# Patient Record
Sex: MALE | Race: OTHER | NOT HISPANIC OR LATINO
[De-identification: names, ages, dates, MRNs, and addresses within clinical notes are randomized per-mention and may not be internally consistent; named-entity substitution may affect disease eponyms.]

---

## 2017-11-01 ENCOUNTER — TRANSCRIPTION ENCOUNTER (OUTPATIENT)
Age: 62
End: 2017-11-01

## 2018-01-30 ENCOUNTER — APPOINTMENT (OUTPATIENT)
Dept: PULMONOLOGY | Facility: CLINIC | Age: 63
End: 2018-01-30
Payer: COMMERCIAL

## 2018-01-30 VITALS
HEART RATE: 83 BPM | HEIGHT: 65 IN | SYSTOLIC BLOOD PRESSURE: 110 MMHG | BODY MASS INDEX: 33.32 KG/M2 | WEIGHT: 200 LBS | RESPIRATION RATE: 16 BRPM | DIASTOLIC BLOOD PRESSURE: 80 MMHG | OXYGEN SATURATION: 97 % | TEMPERATURE: 97.2 F

## 2018-01-30 PROCEDURE — 99213 OFFICE O/P EST LOW 20 MIN: CPT

## 2021-12-07 ENCOUNTER — TRANSCRIPTION ENCOUNTER (OUTPATIENT)
Age: 66
End: 2021-12-07

## 2022-01-31 ENCOUNTER — APPOINTMENT (OUTPATIENT)
Dept: ORTHOPEDIC SURGERY | Facility: CLINIC | Age: 67
End: 2022-01-31
Payer: MEDICARE

## 2022-01-31 ENCOUNTER — NON-APPOINTMENT (OUTPATIENT)
Age: 67
End: 2022-01-31

## 2022-01-31 VITALS
BODY MASS INDEX: 34.99 KG/M2 | SYSTOLIC BLOOD PRESSURE: 132 MMHG | HEIGHT: 65 IN | WEIGHT: 210 LBS | HEART RATE: 76 BPM | DIASTOLIC BLOOD PRESSURE: 77 MMHG

## 2022-01-31 DIAGNOSIS — M65.312 TRIGGER THUMB, LEFT THUMB: ICD-10-CM

## 2022-01-31 PROCEDURE — 99203 OFFICE O/P NEW LOW 30 MIN: CPT | Mod: 25

## 2022-01-31 PROCEDURE — 20550 NJX 1 TENDON SHEATH/LIGAMENT: CPT | Mod: FA

## 2022-01-31 NOTE — HISTORY OF PRESENT ILLNESS
[Left] : left hand dominant [FreeTextEntry1] : Pt is a 65 y/o male with left thumb pain and triggering x 2 months.  It is worse in the morning when he has to force the finger open.  He has tenderness over the A1 pulley.  He has stiffness.  He states that the symptoms improve throughout the day.

## 2022-01-31 NOTE — PHYSICAL EXAM
[de-identified] : Patient is WDWN, alert, and in no acute distress. Breathing is unlabored. He is grossly oriented to person, place, and time.\par \par Left Hand (Thumb):\par There is A1 pulley tenderness in the [] finger. Full arc of motion in the fingers, and all intrinsic and extrinsic hand muscles 5/5. No joint instability on provocative testing, sensation is intact to light touch, and no skin lesions or discoloration.  [de-identified] : no imaging today

## 2022-01-31 NOTE — DISCUSSION/SUMMARY
[FreeTextEntry1] : The underlying pathophysiology was reviewed with the patient. Discussed at length the nature of the patient’s condition. The left thumb symptoms appear secondary to trigger finger.\par \par At this time, I am recommending a cortisone injection to the flexor tendon sheath of the left thumb.\par The patient wishes to proceed with a cortisone injection at this time. The skin was prepped with alcohol and sprayed with Ethyl Chloride. An injection of 0.5 cc 1% Lidocaine without epinephrine, 0.25 cc Kenalog 40mg, and 0.25 cc Dexamethasone was administered into the flexor tendon sheath of the LEFT thumb (1). The patient tolerated the procedure well. Apply ice. \par \par All questions answered, understanding verbalized. Patient in agreement with plan of care. Follow up as needed, according to his symptoms.

## 2022-01-31 NOTE — ADDENDUM
[FreeTextEntry1] : I, Fernanda Peters wrote this note acting as a scribe for Dr. Jese Navarro on Jan 31, 2022.

## 2022-01-31 NOTE — END OF VISIT
[FreeTextEntry3] : All medical record entries made by the Scribe were at my,  Dr. Jese Navarro MD., direction and personally dictated by me on 01/31/2022. I have personally reviewed the chart and agree that the record accurately reflects my personal performance of the history, physical exam, assessment and plan.

## 2023-04-28 ENCOUNTER — APPOINTMENT (OUTPATIENT)
Dept: ORTHOPEDIC SURGERY | Facility: CLINIC | Age: 68
End: 2023-04-28

## 2023-05-09 ENCOUNTER — APPOINTMENT (OUTPATIENT)
Dept: NEUROSURGERY | Facility: CLINIC | Age: 68
End: 2023-05-09
Payer: MEDICARE

## 2023-05-09 VITALS
BODY MASS INDEX: 34.99 KG/M2 | HEART RATE: 78 BPM | WEIGHT: 210 LBS | HEIGHT: 65 IN | DIASTOLIC BLOOD PRESSURE: 85 MMHG | SYSTOLIC BLOOD PRESSURE: 131 MMHG

## 2023-05-09 PROCEDURE — 99203 OFFICE O/P NEW LOW 30 MIN: CPT

## 2023-05-09 PROCEDURE — 72100 X-RAY EXAM L-S SPINE 2/3 VWS: CPT

## 2023-05-22 NOTE — REASON FOR VISIT
[New Patient Visit] : a new patient visit [Referred By: _________] : Patient was referred by DAVID [FreeTextEntry1] : Lumbar pain- no imaging

## 2023-05-22 NOTE — RESULTS/DATA
[FreeTextEntry1] : X-rays of the lumbar spine and AP lateral views completed on 5/9/2023 in the office interpretation limited only to spine shows good alignment and normal curvature of the spine.  There is signs of degenerative disc disease at L5-S1\par

## 2023-05-22 NOTE — HISTORY OF PRESENT ILLNESS
[FreeTextEntry1] : low back pain [de-identified] : 67 year old male 2 week low back pain\par denies leg pain\par no new nt, weakness, balance issues, or bladder issues\par denies relieving or aggravating factors\par no pt\par alleve and muscle relaxer with some relief

## 2023-05-22 NOTE — PLAN
[FreeTextEntry1] : In summary this is a 67-year-old male with new onset back pain.  Recommend conservative measures we will start physical therapy as well as a course of Celebrex if the symptoms were to persist over the next 6 weeks I would consider ordering MRI lumbar spine understands and agrees with plan I will send the medication to his pharmacy.  Provided prescription for physical therapy

## 2023-05-31 ENCOUNTER — APPOINTMENT (OUTPATIENT)
Dept: NEUROSURGERY | Facility: CLINIC | Age: 68
End: 2023-05-31
Payer: MEDICARE

## 2023-05-31 VITALS — DIASTOLIC BLOOD PRESSURE: 82 MMHG | SYSTOLIC BLOOD PRESSURE: 129 MMHG

## 2023-05-31 DIAGNOSIS — M54.50 LOW BACK PAIN, UNSPECIFIED: ICD-10-CM

## 2023-05-31 PROCEDURE — 99213 OFFICE O/P EST LOW 20 MIN: CPT

## 2023-05-31 NOTE — PLAN
[FreeTextEntry1] : In summary this is a 67-year-old male with new onset back pain.  no surgery intervention\par conservative measures only

## 2023-05-31 NOTE — RESULTS/DATA
[FreeTextEntry1] : X-rays of the lumbar spine and AP lateral views completed on 5/9/2023 in the office interpretation limited only to spine shows good alignment and normal curvature of the spine.  There is signs of degenerative disc disease at L5-S1\par \par mri zpr show mild ddd and small hnp L3-4

## 2023-05-31 NOTE — HISTORY OF PRESENT ILLNESS
[FreeTextEntry1] : low back pain [de-identified] : 67 year old male 2 week low back pain\par denies leg pain\par no new nt, weakness, balance issues, or bladder issues\par denies relieving or aggravating factors\par no pt\par alleve and muscle relaxer with some relief\par \par \par fu 5/31 back pain improved\par right medial knee numbness\par has mri

## 2023-05-31 NOTE — REASON FOR VISIT
[Follow-Up: _____] : a [unfilled] follow-up visit [FreeTextEntry1] : pt 68 y/o M presents in office today for MRI review no other complaints

## 2024-01-22 ENCOUNTER — APPOINTMENT (OUTPATIENT)
Dept: CARDIOLOGY | Facility: CLINIC | Age: 69
End: 2024-01-22
Payer: MEDICARE

## 2024-01-22 VITALS — DIASTOLIC BLOOD PRESSURE: 68 MMHG | SYSTOLIC BLOOD PRESSURE: 108 MMHG

## 2024-01-22 VITALS
SYSTOLIC BLOOD PRESSURE: 110 MMHG | HEART RATE: 80 BPM | BODY MASS INDEX: 34.49 KG/M2 | WEIGHT: 207 LBS | DIASTOLIC BLOOD PRESSURE: 70 MMHG | HEIGHT: 65 IN | OXYGEN SATURATION: 99 %

## 2024-01-22 DIAGNOSIS — I25.10 ATHEROSCLEROTIC HEART DISEASE OF NATIVE CORONARY ARTERY W/OUT ANGINA PECTORIS: ICD-10-CM

## 2024-01-22 DIAGNOSIS — Z78.9 OTHER SPECIFIED HEALTH STATUS: ICD-10-CM

## 2024-01-22 DIAGNOSIS — I10 ESSENTIAL (PRIMARY) HYPERTENSION: ICD-10-CM

## 2024-01-22 DIAGNOSIS — I77.810 THORACIC AORTIC ECTASIA: ICD-10-CM

## 2024-01-22 PROCEDURE — 99204 OFFICE O/P NEW MOD 45 MIN: CPT

## 2024-01-22 RX ORDER — CELECOXIB 200 MG/1
200 CAPSULE ORAL DAILY
Qty: 30 | Refills: 2 | Status: DISCONTINUED | COMMUNITY
Start: 2023-05-09 | End: 2024-01-22

## 2024-01-22 RX ORDER — CARISOPRODOL 350 MG/1
350 TABLET ORAL
Refills: 0 | Status: DISCONTINUED | COMMUNITY
End: 2024-01-22

## 2024-01-22 NOTE — ASSESSMENT
[FreeTextEntry1] : CAD: The patient has CAD by CT calcium scoring.  Continue aspirin and Plavix therapy.  Dilated aorta: We will plan for repeat echocardiography in April.  Will consider CT scanning of the aorta at that time.  Studies reviewed from outside are captured in the HPI section of this note.

## 2024-01-22 NOTE — REASON FOR VISIT
[FreeTextEntry1] :   Chief complaint: The patient seeks a second opinion with regards to dilated aorta and coronary artery calcification.  The patient underwent CT calcium scoring.  It was low but not 0.  The patient underwent myocardial perfusion scanning which revealed normal no myocardial perfusion defects.  In addition the patient underwent transthoracic echocardiography which showed an ascending aorta of 4.0 cm.  Patient also has slightly dilated right ventricle and right atrium.  There is mild tricuspid insufficiency.  The patient brings with him phlebotomy with an LDL of 70 AST and ALT LAT normal these were done on December 7, 2023.  Transthoracic echocardiography reported above was done on March 24, 2023.  PET myocardial perfusion scanning revealing no evidence of myocardial perfusion defects was done on April 24, 2023.

## 2024-02-09 ENCOUNTER — APPOINTMENT (OUTPATIENT)
Dept: PULMONOLOGY | Facility: CLINIC | Age: 69
End: 2024-02-09
Payer: MEDICARE

## 2024-02-09 VITALS
HEIGHT: 65 IN | DIASTOLIC BLOOD PRESSURE: 85 MMHG | HEART RATE: 72 BPM | WEIGHT: 204 LBS | BODY MASS INDEX: 33.99 KG/M2 | SYSTOLIC BLOOD PRESSURE: 144 MMHG | RESPIRATION RATE: 17 BRPM | OXYGEN SATURATION: 98 %

## 2024-02-09 PROCEDURE — 99204 OFFICE O/P NEW MOD 45 MIN: CPT

## 2024-02-09 NOTE — PHYSICAL EXAM
[General Appearance - Well Developed] : well developed [Normal Conjunctiva] : the conjunctiva exhibited no abnormalities [Neck Appearance] : the appearance of the neck was normal [Oriented To Time, Place, And Person] : oriented to person, place, and time [Affect] : the affect was normal [III] : III [Heart Rate And Rhythm] : heart rate and rhythm were normal [Heart Sounds] : normal S1 and S2 [Murmurs] : no murmurs present [Respiration, Rhythm And Depth] : normal respiratory rhythm and effort [Auscultation Breath Sounds / Voice Sounds] : lungs were clear to auscultation bilaterally [Skin Color & Pigmentation] : normal skin color and pigmentation [Abnormal Walk] : normal gait [Nail Clubbing] : no clubbing  or cyanosis of the fingernails [] : no rash [No Focal Deficits] : no focal deficits [FreeTextEntry1] : No edema

## 2024-02-09 NOTE — ASSESSMENT
[FreeTextEntry1] : ATTENDING ATTESTATION  68 year old Pmhx of severe ENIO AHI 54 on CPAP of 10, HTN, dilated aortic root. Here today for new machine has not been seen since 2018 machine is at end of its motor lifespan per error message.   Severe ENIO.  -Data download from last 30 days reviewed for compliance and therapy shows 100% compliance, average use 7 hours 25 min, treatment AHI 2.4hr  - Advised against using ozone related products for cleaning the pap machine and supplies, discussed potential risks of foam breakdown ect. and Recommended pt use warm soapy water and let air dry  - Discussed importance of using new masks and tubing and will receive from DME  - Prior DME was [facility]  - ordered new CPAP of 10 today - Nasal pillows mask  - f/u 1-2 months after obtains device can be teb visit.   HTN  - repeat /85  - Recommended pt follow up with cardiology may need dose adjustment for amlodipine.

## 2024-02-09 NOTE — END OF VISIT
[] : Fellow [FreeTextEntry3] : I, Dr. Alice Fishman personally performed the evaluation and management (E/M) services for this new patient.  That E/M includes conducting the initial examination, assessing all conditions, and establishing the plan of care.  Today, Vielka Palomares ACP was here to observe my evaluation and management services for this patient to be followed going forward.  Pt with severe ENIO AHI 54 on CPAP of 10, HTN, dilated aortic root. Here today for new machine has not been seen since 2018 machine is at end of its motor lifespan per error message. Order new CPAP 10, normalized AHI on therapy data review and good adherence. Patient was advised to followup in office visit for compliance and therapy data within one to 2 months of initiating pap therapy.  45 minutes time spent for patient education related to comorbidities and medications, medical records/labs/radiology reviews,  preventative care, documentation, coordination of care. 11:15 AM to 12 pm

## 2024-02-09 NOTE — HISTORY OF PRESENT ILLNESS
[Obstructive Sleep Apnea] : obstructive sleep apnea [Snoring] : snoring [Witnessed Apneas] : witnessed sleep apnea [Unintentional Sleep While Inactive] : unintentional sleep while inactive [Daytime Somnolence] : daytime somnolence [To Bed: ___] : ~he/she~ goes to bed at [unfilled] [Arises: ___] : arises at [unfilled] [Sleep Onset Latency: ___ minutes] : sleep onset latency of [unfilled] minutes reported [Nocturnal Awakenings: ___] : ~he/she~ typically has [unfilled] nocturnal awakenings [WASO: ___] : Wake time after sleep onset is [unfilled] [TST: ___] : Total sleep time is [unfilled] [Daytime Sleep: ___] : daytime sleep: [unfilled] [FreeTextEntry1] : 67 yo M with a h/o ENIO diagnosed in 2009 at [facility], AHI of 22.3 and was treated with APAP set up to a max pressure of 20. He was seen for re-evaluation of his ENIO in June 2016, underwent Split study on 6/22/16 which showed evidence of severe ENIO with AHI of 54/hr and was titrated to a CPAP pressure of 10 cmH2O. Has been using new Resmed Aisense 10 AutoSet set at [id number] with a nasal pillows.  He hasn't been seen since 2018 in office. Pt reports good benefit with CPAP usage, resolution of daytime somnolence. He denies HA, dry mouth, EDS, drowsy driving. No new diagnoses, surgeries or hospitalizations. He notices that when he doesn't use CPAP (rarely), he has witnessed apneas and gasping sensations and snoring. He was living in [place] but has since returned to [place] in 2022. Has not taken his BP meds in a few days because he was running low 114/60s, . He uses his machine nightly, but it has an error message that says it is near the end of its motor lifespan. Was using ozone   Data download from last 30 days for compliance and therapy shows 100% compliance, average use 7 hours 25 min, treatment AHI 2.4hr   [Frequent Nocturnal Awakening] : no nocturnal awakening [Unintentional Sleep while Active] : no unintentional sleep while active [Awakes Unrefreshed] : does not awake unrefreshed [Awakes with Headache] : no headache upon awakening [Awakening With Dry Mouth] : no dry mouth upon awakening [Recent  Weight Gain] : no recent weight gain [DIS] : no DIS [DMS] : no DMS [Unusual Sleep Behavior] : no unusual sleep behavior [Hypersomnolence] : no hypersomnolence [Cataplexy] : no cataplexy [Sleep Paralysis] : no sleep paralysis [Hypnagogic Hallucinations] : no hallucinations when falling asleep [Hypnopompic Hallucinations] : no hallucinations when awakening [Lower Extremity Discomfort] : no lower extremity discomfort in evening or at bedtime [ESS] : 8

## 2024-02-09 NOTE — DISCUSSION/SUMMARY
[FreeTextEntry1] : 63 yo M with a h/o ENIO diagnosed in 2009 at [facility], AHI of 22.3 and was treated with APAP set up to a max pressure of 20, written on DME prescription order from 2009 provided by pt. \par  He was seen for re-evaluation of his ENIO in June 2016, underwent Split study on 6/22/16 which showed evidence of severe ENIO with AHI of 54/hr and was titrated to a CPAP pressure of 10 cmH2O. Has been using new Resmed Aisense 10 AutoSet set at [id number] with a nasal mask.\par  \par  He reports good benefit with CPAP usage, resolution of daytime somnolence. He denies HA, dry mouth, EDS, drowsy driving. Bedtime 10 pm, out of bed at 6 am. \par  \par  A/P: Severe ENIO on CPAP treatment. Clinically deriving benefit and AHI on therapy data is within normal limits.\par  Will continue with CPAP therapy. Advised that should be receiving a new mask every 3 months and other supplies every 6-12 months. Annual follow up or earlier as needed.

## 2024-02-21 ENCOUNTER — TRANSCRIPTION ENCOUNTER (OUTPATIENT)
Age: 69
End: 2024-02-21

## 2024-04-22 ENCOUNTER — APPOINTMENT (OUTPATIENT)
Dept: CARDIOLOGY | Facility: CLINIC | Age: 69
End: 2024-04-22
Payer: MEDICARE

## 2024-04-22 PROCEDURE — 93306 TTE W/DOPPLER COMPLETE: CPT

## 2024-04-23 ENCOUNTER — APPOINTMENT (OUTPATIENT)
Dept: CARDIOLOGY | Facility: CLINIC | Age: 69
End: 2024-04-23
Payer: MEDICARE

## 2024-04-23 VITALS
HEART RATE: 77 BPM | BODY MASS INDEX: 33.99 KG/M2 | HEIGHT: 65 IN | SYSTOLIC BLOOD PRESSURE: 142 MMHG | OXYGEN SATURATION: 97 % | DIASTOLIC BLOOD PRESSURE: 80 MMHG | WEIGHT: 204 LBS

## 2024-04-23 PROCEDURE — 99214 OFFICE O/P EST MOD 30 MIN: CPT

## 2024-04-23 NOTE — ASSESSMENT
[FreeTextEntry1] : Palpitations: 7-day event monitoring has been arranged to evaluate for dysrhythmia.  Hyperlipidemia: Atorvastatin has been prescribed  at the patient's request.  Hyperlipidemia: Atorvastatin has been prescribed the patient's request.  Liver cyst: GI evaluation has been recommended.  Pulmonary hypertension: Pulmonary evaluation has been recommended.

## 2024-04-23 NOTE — REASON FOR VISIT
[FreeTextEntry1] : Patient seen in follow-up for hypertension hyperlipidemia.  The patient shows fairly good exercise capacity without exertional symptoms.  The patient is a palpitation syndrome.  He feels a fluttering like sensation in his chest for a few seconds at a time.  It seems to occur a few times per day.  There has been no syncope.  There has been no near syncope.

## 2024-04-26 ENCOUNTER — APPOINTMENT (OUTPATIENT)
Dept: ENDOCRINOLOGY | Facility: CLINIC | Age: 69
End: 2024-04-26
Payer: MEDICARE

## 2024-04-26 VITALS
SYSTOLIC BLOOD PRESSURE: 130 MMHG | HEIGHT: 65 IN | BODY MASS INDEX: 34.16 KG/M2 | WEIGHT: 205 LBS | OXYGEN SATURATION: 97 % | DIASTOLIC BLOOD PRESSURE: 84 MMHG | HEART RATE: 64 BPM

## 2024-04-26 DIAGNOSIS — E66.9 OBESITY, UNSPECIFIED: ICD-10-CM

## 2024-04-26 DIAGNOSIS — R73.03 PREDIABETES.: ICD-10-CM

## 2024-04-26 DIAGNOSIS — R53.83 OTHER FATIGUE: ICD-10-CM

## 2024-04-26 DIAGNOSIS — E78.5 HYPERLIPIDEMIA, UNSPECIFIED: ICD-10-CM

## 2024-04-26 PROCEDURE — 99204 OFFICE O/P NEW MOD 45 MIN: CPT

## 2024-04-26 RX ORDER — TIRZEPATIDE 2.5 MG/.5ML
2.5 INJECTION, SOLUTION SUBCUTANEOUS
Qty: 4 | Refills: 0 | Status: ACTIVE | COMMUNITY
Start: 2024-04-26 | End: 1900-01-01

## 2024-04-26 NOTE — REASON FOR VISIT
[Initial Evaluation] : an initial evaluation [Hypogonadism] : hypogonadism [Weight Management/Obesity] : weight management/obesity

## 2024-04-26 NOTE — ASSESSMENT
[FreeTextEntry1] : 1- Fatigue despite using CPCP  Plan: -Pt was asked to check labs fasting early morning after a full night sleep. We discussed we need to 2 set of abnormal TFT to ensure testosterone is low. I explained with weight loss his body will naturally produce more T.  - Will call you with instructions to repeat the labs if needed after first set of labs are completed.   2- Vit D deficiency  Started taking Vit D 5,000 IU daily since Feb after labs showed a vit D level of 46. He was advised to decrease the dose to 1000 IU daily.   3- Obesity 4- DM 5- HLD- currently on Atorvastatin 10 mg daily  Plan: - Will start Zepbound 2.5 mg once a week. We discussed to discontinue the medication if he develops abdominal pain and seek immediate medical attention.  - Currently on [employer] system  - Will check lipid panel   Follow up in 3 month.

## 2024-04-26 NOTE — HISTORY OF PRESENT ILLNESS
[FreeTextEntry1] : 68 year old man with h/o obesity (BMI 34), ENIO, HLD and HTN presented to establish care.   Labs from Feb 2024-  Vit D 46, TSH 0.5, FT4 1.3, Total T 554, Free T at 5.3 (RR 6.6-18.1)  Normal bone scan in 2024.   He is concerned about having low T, and belives that may be the reason he has difficulty with weight loss.   Never been on TRT.   No h/o VTE, prostate cancer. No h/o known CAD.   Reports difficulty with weight management since childhood. He last some weight with exercise but regained it back when he started working. Several times tried [employer] system, lost at most about 20 lb and regained it back. Reports being physically active and tryung to eat healthy diet.   No h/o pancreatitis.  Takes Atorvastatin 10 mg daily for hyperlipidemia.  Started taking Vit D 5,000 IU since few month ago.

## 2024-04-29 DIAGNOSIS — Z80.8 FAMILY HISTORY OF MALIGNANT NEOPLASM OF OTHER ORGANS OR SYSTEMS: ICD-10-CM

## 2024-04-29 DIAGNOSIS — G47.30 SLEEP APNEA, UNSPECIFIED: ICD-10-CM

## 2024-04-29 DIAGNOSIS — Z80.3 FAMILY HISTORY OF MALIGNANT NEOPLASM OF BREAST: ICD-10-CM

## 2024-04-29 DIAGNOSIS — Z82.49 FAMILY HISTORY OF ISCHEMIC HEART DISEASE AND OTHER DISEASES OF THE CIRCULATORY SYSTEM: ICD-10-CM

## 2024-04-29 DIAGNOSIS — Z77.22 CONTACT WITH AND (SUSPECTED) EXPOSURE TO ENVIRONMENTAL TOBACCO SMOKE (ACUTE) (CHRONIC): ICD-10-CM

## 2024-04-29 DIAGNOSIS — Z86.79 PERSONAL HISTORY OF OTHER DISEASES OF THE CIRCULATORY SYSTEM: ICD-10-CM

## 2024-04-29 DIAGNOSIS — Z78.9 OTHER SPECIFIED HEALTH STATUS: ICD-10-CM

## 2024-04-29 RX ORDER — CHROMIUM 200 MCG
TABLET ORAL
Refills: 0 | Status: ACTIVE | COMMUNITY

## 2024-04-29 RX ORDER — AMLODIPINE BESYLATE 10 MG/1
10 TABLET ORAL
Qty: 90 | Refills: 3 | Status: ACTIVE | COMMUNITY

## 2024-04-29 RX ORDER — MULTIVITAMIN/IRON/FOLIC ACID 18MG-0.4MG
TABLET ORAL
Refills: 0 | Status: ACTIVE | COMMUNITY

## 2024-04-29 RX ORDER — FAMOTIDINE 20 MG/1
20 TABLET, FILM COATED ORAL
Refills: 0 | Status: ACTIVE | COMMUNITY

## 2024-04-29 RX ORDER — ZINC SULFATE 50(220)MG
CAPSULE ORAL
Refills: 0 | Status: ACTIVE | COMMUNITY

## 2024-04-29 RX ORDER — TURMERIC/TURMERIC EXT/PEPR EXT 900-100 MG
CAPSULE ORAL
Refills: 0 | Status: ACTIVE | COMMUNITY

## 2024-04-29 RX ORDER — VITAMIN E (DL,TOCOPHERYL ACET) 180 MG
CAPSULE ORAL
Refills: 0 | Status: ACTIVE | COMMUNITY

## 2024-04-29 RX ORDER — CHOLECALCIFEROL (VITAMIN D3) 125 MCG
125 MCG CAPSULE ORAL
Refills: 0 | Status: ACTIVE | COMMUNITY

## 2024-04-29 RX ORDER — ATORVASTATIN CALCIUM 10 MG/1
10 TABLET, FILM COATED ORAL
Qty: 90 | Refills: 3 | Status: ACTIVE | COMMUNITY

## 2024-04-29 RX ORDER — MAGNESIUM OXIDE/MAG AA CHELATE 300 MG
CAPSULE ORAL
Refills: 0 | Status: ACTIVE | COMMUNITY

## 2024-04-29 RX ORDER — LISINOPRIL 40 MG/1
40 TABLET ORAL
Refills: 0 | Status: ACTIVE | COMMUNITY

## 2024-04-29 RX ORDER — BERBERINE CHLOR/SEAWEED/CHROM 500-250 MG
CAPSULE ORAL
Refills: 0 | Status: ACTIVE | COMMUNITY

## 2024-04-29 RX ORDER — ASPIRIN 81 MG
81 TABLET, DELAYED RELEASE (ENTERIC COATED) ORAL
Refills: 0 | Status: ACTIVE | COMMUNITY

## 2024-04-29 RX ORDER — PSYLLIUM HUSK 0.4 G
CAPSULE ORAL
Refills: 0 | Status: ACTIVE | COMMUNITY

## 2024-05-07 ENCOUNTER — APPOINTMENT (OUTPATIENT)
Dept: CARDIOLOGY | Facility: CLINIC | Age: 69
End: 2024-05-07
Payer: MEDICARE

## 2024-05-07 DIAGNOSIS — R00.2 PALPITATIONS: ICD-10-CM

## 2024-05-07 PROCEDURE — 99214 OFFICE O/P EST MOD 30 MIN: CPT

## 2024-05-07 NOTE — ASSESSMENT
[FreeTextEntry1] : Palpitations: Patient had multiple palpitations in the setting of normal sinus rhythm.  He also has occasional APCs and VPCs.  There was at least 1 ventricular triplet.    Patient does not wish to undergo beta-blockade therapy.  Plan will be to consider beta-blockade if his symptoms worsen.  Chest discomfort: None recently.  Holter monitoring in April 2024 revealed symptoms with normal sinus rhythm, APCs and VPCs.  Pt. 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 Ordering Location: [facility] Admission Status: Outpatient Study Information: Image quality for this study is good.  _______________________________________________________________________________________  CONCLUSIONS:  1. Left ventricular systolic function is slightly hyperdynamic with an ejection fraction of 73 % by Theodore's method of disks with an ejection fraction visually estimated at >65 %. No significant cavity gradient with valsalva. Sigmoid septum noted at 1.5 cm. 2. Normal left ventricular diastolic function. 3. Aortic root at the sinuses of Valsalva is dilated, measuring 4.00 cm (indexed 2.00 cm/m). 4. There is focal calcification on LCC of the aortic valve leaflets. 5. Mild eccentric aortic regurgitation. 6. Prolapse of the posterior mitral leaflet. 7. Mild to moderate mitral regurgitation. Multiple late systolic jets. 8. The left atrium is moderately dilated. 9. Mild to moderate tricuspid regurgitation. 10. The right atrium is dilated. 11. Moderately enlarged right ventricular cavity size. 12. Mild pulmonic regurgitation. 13. Estimated pulmonary artery systolic pressure is 39 mmHg, consistent with borderline pulmonary hypertension. 14. Hypermobile IAS. 15. Normal pericardium. 16. No pericardial effusion seen. 17. Echo-free space is noted in the liver consistent with cyst, however, dedicated imaging recommended for further evaluation if clinically indicated. 18. Compared to outside prior on 04/2023- Increase in PASP.  TSH was 0.78 which is normal on 5/4/2024.

## 2024-05-07 NOTE — REASON FOR VISIT
[FreeTextEntry1] : The patient is a palpitations.  Patient has fairly good exercise capacity without exertional symptoms.

## 2024-05-28 ENCOUNTER — APPOINTMENT (OUTPATIENT)
Dept: PULMONOLOGY | Facility: CLINIC | Age: 69
End: 2024-05-28
Payer: MEDICARE

## 2024-05-28 DIAGNOSIS — G47.33 OBSTRUCTIVE SLEEP APNEA (ADULT) (PEDIATRIC): ICD-10-CM

## 2024-05-28 DIAGNOSIS — R93.1 ABNORMAL FINDINGS ON DIAGNOSTIC IMAGING OF HEART AND CORONARY CIRCULATION: ICD-10-CM

## 2024-05-28 PROCEDURE — 99215 OFFICE O/P EST HI 40 MIN: CPT

## 2024-05-28 NOTE — END OF VISIT
[] : Fellow [FreeTextEntry3] : I, Dr. Alice Fishman, personally performed the evaluation and management (E/M) services for this established patient who presents today with (a) new problem(s)/exacerbation of (an) existing condition(s).  That E/M includes conducting the examination, assessing all new/exacerbated conditions, and establishing a new plan of care.  Today, Vielka Palomares ACP, was here to observe my evaluation and management services for this new problem/exacerbated condition to be followed going forward.  Pt with ENIO, on CPAP 10, benefitting from PAP, continue PAP therapy. Cardiology referred pt for borderline PASP on TTE. Pt denies lung illness or respiratory symptoms. Pt has valvular disease, some arrhythmias on holter. Will screen for any hypoxemia or lung disease, check nocturnal pulse ox, CXR, cPFT.  F/u in OV in 3-4 mos. 40 minutes time spent for patient education related to comorbidities and medications, medical records/labs/radiology reviews, preventative care, documentation, coordination of care.

## 2024-05-28 NOTE — ASSESSMENT
[FreeTextEntry1] : ATTENDING ATTESTATION  67 yo M with a h/o ENIO diagnosed in 2009 at [facility], AHI of 22.3 and was treated with APAP set up to a max pressure of 20, written on DME prescription order from 2009 provided by pt.  He was seen for re-evaluation of his ENIO in June 2016, underwent Split study on 6/22/16 which showed evidence of severe ENIO with AHI of 54/hr and was titrated to a CPAP pressure of 10 cmH2O. Has been using new Resmed Aisense 11 AutoSet set at [id number] with a nasal pillows.  Severe ENIO:  - Continue CPAP 10 nightly  - Requested device be linked to remote account   Borderline Pulm HTN (PASP 39 mmHg) on 2024 TTE ordered by cardiology. Pt without respiratory symptoms, no known lung disease. Holter reported APCs and VPCs (beta blocker was offered by cardiology but pt declined). pt has mild to mod MR, LAE, LYNNETTE, mild to mod TR, moderate RVE, normal diastolic fx, EF 73%. referred to pulm for borderline PASP. - ordered CPFT  - Recommended overnight oximetry to be done while wearing CPAP  - CXR ordered   f/u in 3 months with PFT

## 2024-05-28 NOTE — DISCUSSION/SUMMARY
[FreeTextEntry1] : 69 yo M with a h/o ENIO diagnosed in 2009 at [facility], AHI of 22.3 and was treated with APAP set up to a max pressure of 20, written on DME prescription order from 2009 provided by pt.  He was seen for re-evaluation of his ENIO in June 2016, underwent Split study on 6/22/16 which showed evidence of severe ENIO with AHI of 54/hr and was titrated to a CPAP pressure of 10 cmH2O. Has been using new Resmed Aisense 11 AutoSet set at [id number] with a nasal pillows.  Severe ENIO:  - Continue CPAP 10 nightly  - Requested device be linked to remote account   Borderline/Mild Pulm HTN (39mmHg) - ordered CPFT  - Recommended overnight oximetry to be done while wearing CPAP  - CXR ordered   f/u in 3 months with PFT

## 2024-05-28 NOTE — REASON FOR VISIT
[Follow-Up] : a follow-up visit [Home] : at home, [unfilled] , at the time of the visit. [Medical Office: ([facility])___] : at the medical office located in

## 2024-05-28 NOTE — HISTORY OF PRESENT ILLNESS
[Obstructive Sleep Apnea] : obstructive sleep apnea [Snoring] : snoring [Witnessed Apneas] : witnessed sleep apnea [Unintentional Sleep While Inactive] : unintentional sleep while inactive [Daytime Somnolence] : daytime somnolence [To Bed: ___] : ~he/she~ goes to bed at [unfilled] [Arises: ___] : arises at [unfilled] [Sleep Onset Latency: ___ minutes] : sleep onset latency of [unfilled] minutes reported [Nocturnal Awakenings: ___] : ~he/she~ typically has [unfilled] nocturnal awakenings [WASO: ___] : Wake time after sleep onset is [unfilled] [TST: ___] : Total sleep time is [unfilled] [Daytime Sleep: ___] : daytime sleep: [unfilled] [FreeTextEntry1] : 69 yo M with a h/o ENIO diagnosed in 2009 at [facility], AHI of 22.3 and was treated with APAP set up to a max pressure of 20. He was seen for re-evaluation of his ENIO in June 2016, underwent Split study on 6/22/16 which showed evidence of severe ENIO with AHI of 54/hr and was titrated to a CPAP pressure of 10 cmH2O. Received his new Resmed Aisense 11 AutoSet set at [id number] with a nasal pillows in Feb 24' Never smoker  Seen today on telehealth concerns of ECHO results mild/borderline Pulmonary HTN 39mmHg. Pt denies any acute respiratory complaints, No SOB, swelling, GREENBERG, dizziness, fatigue, fainting, chest pain. No hx of joint issues, rashes, blood clots, liver or lung disease. Some valuvlar disease seen on Echo mild/mod MR & TR, moderately enlarged RV and dilated LA. Had reported palpitations to cardiologist holter was done with PVCs. Pt reports good benefit with CPAP usage, resolution of daytime somnolence. He denies HA, dry mouth, EDS, drowsy driving. No new diagnoses, surgeries or hospitalizations. Using PAP nightly no issues so far. Exercises multiple times a week without issues.   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 Never smoker (second hand exposure as child)    Pt asking about TTE 4/2024 - borderline PHTN  1. Left ventricular systolic function is slightly hyperdynamic with an ejection fraction of 73 % by Theodore's method of disks with an ejection fraction visually estimated at >65 %. No significant cavity gradient with valsalva. Sigmoid septum noted at 1.5 cm. 2. Normal left ventricular diastolic function. 3. Aortic root at the sinuses of Valsalva is dilated, measuring 4.00 cm (indexed 2.00 cm/m). 4. There is focal calcification on LCC of the aortic valve leaflets. 5. Mild eccentric aortic regurgitation. 6. Prolapse of the posterior mitral leaflet. 7. Mild to moderate mitral regurgitation. Multiple late systolic jets. 8. The left atrium is moderately dilated. 9. Mild to moderate tricuspid regurgitation. 10. The right atrium is dilated. 11. Moderately enlarged right ventricular cavity size. 12. Mild pulmonic regurgitation. 13. Estimated pulmonary artery systolic pressure is 39 mmHg, consistent with borderline pulmonary hypertension. 14. Hypermobile IAS. 15. Normal pericardium. 16. No pericardial effusion seen. 17. Echo-free space is noted in the liver consistent with cyst, however, dedicated imaging recommended for further evaluation if clinically indicated. 18. Compared to outside prior on 04/2023- Increase in PASP. [Frequent Nocturnal Awakening] : no nocturnal awakening [Unintentional Sleep while Active] : no unintentional sleep while active [Awakes Unrefreshed] : does not awake unrefreshed [Awakes with Headache] : no headache upon awakening [Awakening With Dry Mouth] : no dry mouth upon awakening [Recent  Weight Gain] : no recent weight gain [DIS] : no DIS [DMS] : no DMS [Unusual Sleep Behavior] : no unusual sleep behavior [Hypersomnolence] : no hypersomnolence [Cataplexy] : no cataplexy [Sleep Paralysis] : no sleep paralysis [Hypnagogic Hallucinations] : no hallucinations when falling asleep [Hypnopompic Hallucinations] : no hallucinations when awakening [Lower Extremity Discomfort] : no lower extremity discomfort in evening or at bedtime [ESS] : 8

## 2024-05-30 ENCOUNTER — APPOINTMENT (OUTPATIENT)
Dept: RADIOLOGY | Facility: CLINIC | Age: 69
End: 2024-05-30
Payer: MEDICARE

## 2024-05-30 PROCEDURE — 71046 X-RAY EXAM CHEST 2 VIEWS: CPT

## 2024-06-20 ENCOUNTER — NON-APPOINTMENT (OUTPATIENT)
Age: 69
End: 2024-06-20

## 2024-06-27 ENCOUNTER — APPOINTMENT (OUTPATIENT)
Dept: PULMONOLOGY | Facility: CLINIC | Age: 69
End: 2024-06-27

## 2024-06-27 ENCOUNTER — APPOINTMENT (OUTPATIENT)
Dept: INTERNAL MEDICINE | Facility: CLINIC | Age: 69
End: 2024-06-27

## 2024-08-13 ENCOUNTER — APPOINTMENT (OUTPATIENT)
Dept: PULMONOLOGY | Facility: CLINIC | Age: 69
End: 2024-08-13
Payer: MEDICARE

## 2024-08-13 VITALS
HEART RATE: 65 BPM | WEIGHT: 200 LBS | OXYGEN SATURATION: 97 % | BODY MASS INDEX: 33.32 KG/M2 | DIASTOLIC BLOOD PRESSURE: 77 MMHG | HEIGHT: 65 IN | SYSTOLIC BLOOD PRESSURE: 122 MMHG

## 2024-08-13 DIAGNOSIS — G47.33 OBSTRUCTIVE SLEEP APNEA (ADULT) (PEDIATRIC): ICD-10-CM

## 2024-08-13 PROCEDURE — 94729 DIFFUSING CAPACITY: CPT

## 2024-08-13 PROCEDURE — ZZZZZ: CPT

## 2024-08-13 PROCEDURE — G2211 COMPLEX E/M VISIT ADD ON: CPT

## 2024-08-13 PROCEDURE — 94726 PLETHYSMOGRAPHY LUNG VOLUMES: CPT

## 2024-08-13 PROCEDURE — 94060 EVALUATION OF WHEEZING: CPT

## 2024-08-13 PROCEDURE — 99214 OFFICE O/P EST MOD 30 MIN: CPT | Mod: 25

## 2024-08-13 PROCEDURE — 99214 OFFICE O/P EST MOD 30 MIN: CPT

## 2024-08-13 NOTE — ASSESSMENT
[FreeTextEntry1] : ATTENDING ATTESTATION  67 yo M with a h/o severe ENIO on Resmed Aisense 11 AutoSet set at 12 cmH2O with a nasal pillows and Borderline Pulmonary HTN with PAP of 39mmHg. With no acute respiratory complaints.   Severe ENIO:  - Continue CPAP 12 nightly  - Compliance data reviewed avg use 7 hrs 10 mins, no significant leak, AHI 2.4.  Borderline Pulm HTN (PASP 39 mmHg) on 2024 TTE ordered by cardiology. Pt without respiratory symptoms, no known lung disease. Holter reported APCs and VPCs (beta blocker was offered by cardiology but pt declined). pt has mild to mod MR, LAE, LYNNETTE, mild to mod TR, moderate RVE, normal diastolic fx, EF 73%. referred to pulm for borderline PASP. - CPFT today wnl %, Fev1 126%, TLC and DLCO wnl - CXR reviewed wnl  - Needs repeat ECHO with cardio has apt in Nov - if elevated PAP persists or worsens can consider Serologies (no rheum complaints) and discussed potential need for right heart catheterization for most accurate pressure no need for this at this time.   f/u in 6 months can be teb.

## 2024-08-13 NOTE — END OF VISIT
[FreeTextEntry3] : I, Dr. Alice Fishman, personally performed the evaluation and management (E/M) services for this established patient who presents today with (a) new problem(s)/exacerbation of (an) existing condition(s).  That E/M includes conducting the examination, assessing all new/exacerbated conditions, and establishing a new plan of care.  Today, Vielka Palomares ACP, was here to observe my evaluation and management services for this new problem/exacerbated condition to be followed going forward.  Pt with ENIO, on CPAP 10, benefitting from PAP, continue PAP therapy. Cardiology referred pt for borderline PASP (39) on TTE. Pt denies lung illness or respiratory symptoms, negative ROS for CTD, denies personal or fam hx of CTED. Pt has valvular disease which could contribute to PHTN, some arrhythmias on holter. Will screen for any hypoxemia or lung disease, NORMAL nocturnal pulse ox done on PAP; CXR normal, cPFT today normal. Continue PAP 12 nightly; adherence/therapy report reviewed; good adherence, normal AHI. Pt extremely active, exercising a lot with tolerance.   F/u in [place] in 6-8 mos after repeat TTE with cardiology.  30 minutes time spent for patient education related to comorbidities and medications, medical records/labs/radiology reviews, preventative care, documentation, coordination of care.

## 2024-08-13 NOTE — PHYSICAL EXAM
[No Acute Distress] : no acute distress [Well Developed] : well developed [Normal Oropharynx] : normal oropharynx [III] : Mallampati Class: III [Normal Appearance] : normal appearance [Normal Rate/Rhythm] : normal rate/rhythm [Normal S1, S2] : normal s1, s2 [No Resp Distress] : no resp distress [Clear to Auscultation Bilaterally] : clear to auscultation bilaterally [Benign] : benign [Normal Gait] : normal gait [No Clubbing] : no clubbing [No Cyanosis] : no cyanosis [No Edema] : no edema [Normal Color/ Pigmentation] : normal color/ pigmentation [No Focal Deficits] : no focal deficits [Oriented x3] : oriented x3 [Normal Affect] : normal affect [FreeTextEntry1] : No edema

## 2024-08-13 NOTE — HISTORY OF PRESENT ILLNESS
[Obstructive Sleep Apnea] : obstructive sleep apnea [Snoring] : snoring [Witnessed Apneas] : witnessed sleep apnea [Unintentional Sleep While Inactive] : unintentional sleep while inactive [Daytime Somnolence] : daytime somnolence [To Bed: ___] : ~he/she~ goes to bed at [unfilled] [Arises: ___] : arises at [unfilled] [Sleep Onset Latency: ___ minutes] : sleep onset latency of [unfilled] minutes reported [Nocturnal Awakenings: ___] : ~he/she~ typically has [unfilled] nocturnal awakenings [WASO: ___] : Wake time after sleep onset is [unfilled] [TST: ___] : Total sleep time is [unfilled] [Daytime Sleep: ___] : daytime sleep: [unfilled] [FreeTextEntry1] : 69 yo M with a borderline Pulm HTN and ENIO diagnosed in 2009 at [facility], AHI of 22.3 and was treated with APAP set up to a max pressure of 20. He was seen for re-evaluation of his ENIO in June 2016, underwent Split study on 6/22/16 which showed evidence of severe ENIO with AHI of 54/hr and was titrated to a CPAP pressure of 10 cmH2O. Received his new Resmed Aisense 11 AutoSet set at [id number] with a nasal pillows in Feb 24' had some persistent events so now on CPAP of 12cmHwO. Never smoker  Seen today for follow up regarding ECHO results mild/borderline Pulmonary HTN 39mmHg (prior 30mmHg in 2023). Pt denies any acute respiratory complaints, No SOB, swelling, GREENBERG, dizziness, fatigue, fainting, chest pain. No hx of joint issues, rashes, blood clots, liver or lung disease. Some valuvlar disease seen on Echo mild/mod MR & TR, moderately enlarged RV and dilated LA. Had reported palpitations to cardiologist holter was done with PVCs. Pt reports good benefit with CPAP usage, resolution of daytime somnolence. He denies HA, dry mouth, EDS, drowsy driving. No new diagnoses, surgeries or hospitalizations. Using PAP nightly no issues so far. Exercises multiple times a week without issues. Weight loss intentional. Cpap was increased to 12 given Overnight HST showed RDI persisted to 7.3 per hour on [id number] with no significant desats (T-88 = 0).   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 Never smoker (second hand exposure as child)   Pt asking about TTE 4/2024 - borderline PHTN  1. Left ventricular systolic function is slightly hyperdynamic with an ejection fraction of 73 % by Theodore's method of disks with an ejection fraction visually estimated at >65 %. No significant cavity gradient with valsalva. Sigmoid septum noted at 1.5 cm. 2. Normal left ventricular diastolic function. 3. Aortic root at the sinuses of Valsalva is dilated, measuring 4.00 cm (indexed 2.00 cm/m). 4. There is focal calcification on LCC of the aortic valve leaflets. 5. Mild eccentric aortic regurgitation. 6. Prolapse of the posterior mitral leaflet. 7. Mild to moderate mitral regurgitation. Multiple late systolic jets. 8. The left atrium is moderately dilated. 9. Mild to moderate tricuspid regurgitation. 10. The right atrium is dilated. 11. Moderately enlarged right ventricular cavity size. 12. Mild pulmonic regurgitation. 13. Estimated pulmonary artery systolic pressure is 39 mmHg, consistent with borderline pulmonary hypertension. 14. Hypermobile IAS. 15. Normal pericardium. 16. No pericardial effusion seen. 17. Echo-free space is noted in the liver consistent with cyst, however, dedicated imaging recommended for further evaluation if clinically indicated. 18. Compared to outside prior on 04/2023- Increase in PASP. [Frequent Nocturnal Awakening] : no nocturnal awakening [Unintentional Sleep while Active] : no unintentional sleep while active [Awakes Unrefreshed] : does not awake unrefreshed [Awakes with Headache] : no headache upon awakening [Awakening With Dry Mouth] : no dry mouth upon awakening [Recent  Weight Gain] : no recent weight gain [DIS] : no DIS [DMS] : no DMS [Unusual Sleep Behavior] : no unusual sleep behavior [Hypersomnolence] : no hypersomnolence [Cataplexy] : no cataplexy [Sleep Paralysis] : no sleep paralysis [Hypnagogic Hallucinations] : no hallucinations when falling asleep [Hypnopompic Hallucinations] : no hallucinations when awakening [Lower Extremity Discomfort] : no lower extremity discomfort in evening or at bedtime [ESS] : 8

## 2024-09-19 DIAGNOSIS — I27.20 PULMONARY HYPERTENSION, UNSPECIFIED: ICD-10-CM

## 2024-10-17 ENCOUNTER — NON-APPOINTMENT (OUTPATIENT)
Age: 69
End: 2024-10-17

## 2024-11-08 ENCOUNTER — NON-APPOINTMENT (OUTPATIENT)
Age: 69
End: 2024-11-08

## 2024-11-08 ENCOUNTER — APPOINTMENT (OUTPATIENT)
Dept: ULTRASOUND IMAGING | Facility: CLINIC | Age: 69
End: 2024-11-08
Payer: MEDICARE

## 2024-11-08 ENCOUNTER — APPOINTMENT (OUTPATIENT)
Dept: UROLOGY | Facility: CLINIC | Age: 69
End: 2024-11-08
Payer: MEDICARE

## 2024-11-08 VITALS
BODY MASS INDEX: 33.32 KG/M2 | SYSTOLIC BLOOD PRESSURE: 113 MMHG | HEART RATE: 76 BPM | DIASTOLIC BLOOD PRESSURE: 67 MMHG | HEIGHT: 65 IN | TEMPERATURE: 97.3 F | WEIGHT: 200 LBS

## 2024-11-08 DIAGNOSIS — Z12.5 ENCOUNTER FOR SCREENING FOR MALIGNANT NEOPLASM OF PROSTATE: ICD-10-CM

## 2024-11-08 DIAGNOSIS — R39.9 UNSPECIFIED SYMPTOMS AND SIGNS INVOLVING THE GENITOURINARY SYSTEM: ICD-10-CM

## 2024-11-08 PROCEDURE — 99204 OFFICE O/P NEW MOD 45 MIN: CPT

## 2024-11-08 PROCEDURE — 76770 US EXAM ABDO BACK WALL COMP: CPT

## 2024-11-11 LAB
APPEARANCE: CLEAR
BACTERIA UR CULT: NORMAL
BACTERIA: NEGATIVE /HPF
BILIRUBIN URINE: NEGATIVE
BLOOD URINE: NEGATIVE
CAST: 1 /LPF
COLOR: YELLOW
EPITHELIAL CELLS: 0 /HPF
GLUCOSE QUALITATIVE U: NEGATIVE MG/DL
KETONES URINE: NEGATIVE MG/DL
LEUKOCYTE ESTERASE URINE: NEGATIVE
MICROSCOPIC-UA: NORMAL
NITRITE URINE: NEGATIVE
PH URINE: 5.5
PROTEIN URINE: NEGATIVE MG/DL
PSA SERPL-MCNC: 3.92 NG/ML
RED BLOOD CELLS URINE: 0 /HPF
SPECIFIC GRAVITY URINE: 1.02
UROBILINOGEN URINE: 0.2 MG/DL
WHITE BLOOD CELLS URINE: 0 /HPF

## 2024-11-12 ENCOUNTER — APPOINTMENT (OUTPATIENT)
Dept: ENDOCRINOLOGY | Facility: CLINIC | Age: 69
End: 2024-11-12
Payer: MEDICARE

## 2024-11-12 DIAGNOSIS — E11.9 TYPE 2 DIABETES MELLITUS W/OUT COMPLICATIONS: ICD-10-CM

## 2024-11-12 DIAGNOSIS — Z13.21 ENCOUNTER FOR SCREENING FOR NUTRITIONAL DISORDER: ICD-10-CM

## 2024-11-12 PROCEDURE — 99214 OFFICE O/P EST MOD 30 MIN: CPT

## 2024-11-12 RX ORDER — SEMAGLUTIDE 0.68 MG/ML
2 INJECTION, SOLUTION SUBCUTANEOUS
Qty: 1 | Refills: 1 | Status: ACTIVE | COMMUNITY
Start: 2024-11-12 | End: 1900-01-01

## 2024-11-14 ENCOUNTER — APPOINTMENT (OUTPATIENT)
Dept: CARDIOLOGY | Facility: CLINIC | Age: 69
End: 2024-11-14
Payer: MEDICARE

## 2024-11-14 VITALS
SYSTOLIC BLOOD PRESSURE: 114 MMHG | OXYGEN SATURATION: 97 % | HEIGHT: 65 IN | WEIGHT: 200 LBS | HEART RATE: 68 BPM | DIASTOLIC BLOOD PRESSURE: 70 MMHG | BODY MASS INDEX: 33.32 KG/M2

## 2024-11-14 DIAGNOSIS — I77.810 THORACIC AORTIC ECTASIA: ICD-10-CM

## 2024-11-14 PROCEDURE — 93306 TTE W/DOPPLER COMPLETE: CPT

## 2024-11-14 PROCEDURE — 99214 OFFICE O/P EST MOD 30 MIN: CPT

## 2024-11-18 ENCOUNTER — APPOINTMENT (OUTPATIENT)
Dept: UROLOGY | Facility: CLINIC | Age: 69
End: 2024-11-18

## 2024-11-18 ENCOUNTER — APPOINTMENT (OUTPATIENT)
Dept: UROLOGY | Facility: CLINIC | Age: 69
End: 2024-11-18
Payer: MEDICARE

## 2024-11-18 VITALS
HEIGHT: 65 IN | WEIGHT: 200 LBS | HEART RATE: 70 BPM | OXYGEN SATURATION: 98 % | TEMPERATURE: 98 F | SYSTOLIC BLOOD PRESSURE: 110 MMHG | DIASTOLIC BLOOD PRESSURE: 70 MMHG | BODY MASS INDEX: 33.32 KG/M2

## 2024-11-18 VITALS
OXYGEN SATURATION: 98 % | HEIGHT: 65 IN | WEIGHT: 200 LBS | HEART RATE: 70 BPM | SYSTOLIC BLOOD PRESSURE: 110 MMHG | BODY MASS INDEX: 33.32 KG/M2 | DIASTOLIC BLOOD PRESSURE: 70 MMHG

## 2024-11-18 DIAGNOSIS — Z12.5 ENCOUNTER FOR SCREENING FOR MALIGNANT NEOPLASM OF PROSTATE: ICD-10-CM

## 2024-11-18 DIAGNOSIS — R39.9 UNSPECIFIED SYMPTOMS AND SIGNS INVOLVING THE GENITOURINARY SYSTEM: ICD-10-CM

## 2024-11-18 PROCEDURE — 99214 OFFICE O/P EST MOD 30 MIN: CPT

## 2024-11-18 RX ORDER — TAMSULOSIN HYDROCHLORIDE 0.4 MG/1
0.4 CAPSULE ORAL
Qty: 30 | Refills: 3 | Status: ACTIVE | COMMUNITY
Start: 2024-11-18 | End: 1900-01-01

## 2024-11-19 ENCOUNTER — TRANSCRIPTION ENCOUNTER (OUTPATIENT)
Age: 69
End: 2024-11-19

## 2024-11-21 ENCOUNTER — APPOINTMENT (OUTPATIENT)
Dept: UROLOGY | Facility: CLINIC | Age: 69
End: 2024-11-21

## 2024-12-09 ENCOUNTER — APPOINTMENT (OUTPATIENT)
Dept: UROLOGY | Facility: CLINIC | Age: 69
End: 2024-12-09
Payer: MEDICARE

## 2024-12-09 VITALS
SYSTOLIC BLOOD PRESSURE: 132 MMHG | HEART RATE: 70 BPM | BODY MASS INDEX: 33.32 KG/M2 | WEIGHT: 200 LBS | HEIGHT: 65 IN | OXYGEN SATURATION: 94 % | DIASTOLIC BLOOD PRESSURE: 70 MMHG

## 2024-12-09 DIAGNOSIS — R39.9 UNSPECIFIED SYMPTOMS AND SIGNS INVOLVING THE GENITOURINARY SYSTEM: ICD-10-CM

## 2024-12-09 PROCEDURE — 99213 OFFICE O/P EST LOW 20 MIN: CPT

## 2025-01-06 ENCOUNTER — RX RENEWAL (OUTPATIENT)
Age: 70
End: 2025-01-06

## 2025-01-08 ENCOUNTER — RX RENEWAL (OUTPATIENT)
Age: 70
End: 2025-01-08

## 2025-01-11 LAB — HBA1C MFR BLD HPLC: 5.8

## 2025-01-13 ENCOUNTER — NON-APPOINTMENT (OUTPATIENT)
Age: 70
End: 2025-01-13

## 2025-01-13 ENCOUNTER — APPOINTMENT (OUTPATIENT)
Dept: CARDIOLOGY | Facility: CLINIC | Age: 70
End: 2025-01-13
Payer: MEDICARE

## 2025-01-13 VITALS
DIASTOLIC BLOOD PRESSURE: 66 MMHG | OXYGEN SATURATION: 97 % | SYSTOLIC BLOOD PRESSURE: 110 MMHG | WEIGHT: 202 LBS | BODY MASS INDEX: 33.66 KG/M2 | HEIGHT: 65 IN | HEART RATE: 81 BPM

## 2025-01-13 VITALS — HEART RATE: 81 BPM | BODY MASS INDEX: 33.61 KG/M2 | WEIGHT: 202 LBS | OXYGEN SATURATION: 97 %

## 2025-01-13 DIAGNOSIS — I77.810 THORACIC AORTIC ECTASIA: ICD-10-CM

## 2025-01-13 DIAGNOSIS — I10 ESSENTIAL (PRIMARY) HYPERTENSION: ICD-10-CM

## 2025-01-13 DIAGNOSIS — Z01.810 ENCOUNTER FOR PREPROCEDURAL CARDIOVASCULAR EXAMINATION: ICD-10-CM

## 2025-01-13 PROCEDURE — 99204 OFFICE O/P NEW MOD 45 MIN: CPT

## 2025-01-13 PROCEDURE — 93000 ELECTROCARDIOGRAM COMPLETE: CPT

## 2025-02-03 DIAGNOSIS — R73.09 OTHER ABNORMAL GLUCOSE: ICD-10-CM

## 2025-02-07 ENCOUNTER — APPOINTMENT (OUTPATIENT)
Dept: PULMONOLOGY | Facility: CLINIC | Age: 70
End: 2025-02-07

## 2025-03-10 ENCOUNTER — APPOINTMENT (OUTPATIENT)
Dept: UROLOGY | Facility: CLINIC | Age: 70
End: 2025-03-10
Payer: MEDICARE

## 2025-03-10 VITALS
DIASTOLIC BLOOD PRESSURE: 82 MMHG | WEIGHT: 202 LBS | HEART RATE: 85 BPM | BODY MASS INDEX: 33.66 KG/M2 | SYSTOLIC BLOOD PRESSURE: 116 MMHG | HEIGHT: 65 IN | OXYGEN SATURATION: 97 %

## 2025-03-10 DIAGNOSIS — Z12.5 ENCOUNTER FOR SCREENING FOR MALIGNANT NEOPLASM OF PROSTATE: ICD-10-CM

## 2025-03-10 DIAGNOSIS — R39.9 UNSPECIFIED SYMPTOMS AND SIGNS INVOLVING THE GENITOURINARY SYSTEM: ICD-10-CM

## 2025-03-10 PROCEDURE — 99214 OFFICE O/P EST MOD 30 MIN: CPT

## 2025-03-12 ENCOUNTER — APPOINTMENT (OUTPATIENT)
Dept: CARDIOLOGY | Facility: CLINIC | Age: 70
End: 2025-03-12
Payer: MEDICARE

## 2025-03-12 VITALS
HEART RATE: 86 BPM | DIASTOLIC BLOOD PRESSURE: 60 MMHG | HEIGHT: 65 IN | OXYGEN SATURATION: 97 % | WEIGHT: 200 LBS | BODY MASS INDEX: 33.32 KG/M2 | SYSTOLIC BLOOD PRESSURE: 112 MMHG

## 2025-03-12 DIAGNOSIS — I34.1 NONRHEUMATIC MITRAL (VALVE) PROLAPSE: ICD-10-CM

## 2025-03-12 DIAGNOSIS — I25.10 ATHEROSCLEROTIC HEART DISEASE OF NATIVE CORONARY ARTERY W/OUT ANGINA PECTORIS: ICD-10-CM

## 2025-03-12 DIAGNOSIS — I10 ESSENTIAL (PRIMARY) HYPERTENSION: ICD-10-CM

## 2025-03-12 DIAGNOSIS — E78.5 HYPERLIPIDEMIA, UNSPECIFIED: ICD-10-CM

## 2025-03-12 DIAGNOSIS — I77.810 THORACIC AORTIC ECTASIA: ICD-10-CM

## 2025-03-12 DIAGNOSIS — I34.0 NONRHEUMATIC MITRAL (VALVE) INSUFFICIENCY: ICD-10-CM

## 2025-03-12 DIAGNOSIS — I35.1 NONRHEUMATIC AORTIC (VALVE) INSUFFICIENCY: ICD-10-CM

## 2025-03-12 PROCEDURE — 99214 OFFICE O/P EST MOD 30 MIN: CPT

## 2025-03-12 PROCEDURE — G2211 COMPLEX E/M VISIT ADD ON: CPT

## 2025-04-08 ENCOUNTER — APPOINTMENT (OUTPATIENT)
Dept: ENDOCRINOLOGY | Facility: CLINIC | Age: 70
End: 2025-04-08
Payer: MEDICARE

## 2025-04-08 VITALS
HEART RATE: 80 BPM | HEIGHT: 65 IN | WEIGHT: 200 LBS | DIASTOLIC BLOOD PRESSURE: 70 MMHG | OXYGEN SATURATION: 99 % | TEMPERATURE: 98 F | RESPIRATION RATE: 16 BRPM | SYSTOLIC BLOOD PRESSURE: 110 MMHG | BODY MASS INDEX: 33.32 KG/M2

## 2025-04-08 DIAGNOSIS — R73.03 PREDIABETES.: ICD-10-CM

## 2025-04-08 DIAGNOSIS — G47.33 OBSTRUCTIVE SLEEP APNEA (ADULT) (PEDIATRIC): ICD-10-CM

## 2025-04-08 DIAGNOSIS — E55.9 VITAMIN D DEFICIENCY, UNSPECIFIED: ICD-10-CM

## 2025-04-08 DIAGNOSIS — E78.5 HYPERLIPIDEMIA, UNSPECIFIED: ICD-10-CM

## 2025-04-08 PROCEDURE — 99213 OFFICE O/P EST LOW 20 MIN: CPT

## 2025-04-08 RX ORDER — TIRZEPATIDE 2.5 MG/.5ML
2.5 INJECTION, SOLUTION SUBCUTANEOUS
Qty: 4 | Refills: 2 | Status: ACTIVE | COMMUNITY
Start: 2025-04-08 | End: 1900-01-01

## 2025-04-08 RX ORDER — PANTOPRAZOLE 20 MG/1
20 TABLET, DELAYED RELEASE ORAL DAILY
Refills: 0 | Status: ACTIVE | COMMUNITY

## 2025-04-30 ENCOUNTER — NON-APPOINTMENT (OUTPATIENT)
Age: 70
End: 2025-04-30

## 2025-07-18 ENCOUNTER — NON-APPOINTMENT (OUTPATIENT)
Age: 70
End: 2025-07-18

## 2025-07-22 ENCOUNTER — APPOINTMENT (OUTPATIENT)
Dept: ENDOCRINOLOGY | Facility: CLINIC | Age: 70
End: 2025-07-22
Payer: MEDICARE

## 2025-07-22 VITALS
SYSTOLIC BLOOD PRESSURE: 120 MMHG | HEART RATE: 80 BPM | BODY MASS INDEX: 33.32 KG/M2 | HEIGHT: 65 IN | DIASTOLIC BLOOD PRESSURE: 86 MMHG | WEIGHT: 200 LBS | OXYGEN SATURATION: 99 %

## 2025-07-22 DIAGNOSIS — Z86.39 PERSONAL HISTORY OF OTHER ENDOCRINE, NUTRITIONAL AND METABOLIC DISEASE: ICD-10-CM

## 2025-07-22 DIAGNOSIS — Z13.820 ENCOUNTER FOR SCREENING FOR OSTEOPOROSIS: ICD-10-CM

## 2025-07-22 DIAGNOSIS — E78.5 HYPERLIPIDEMIA, UNSPECIFIED: ICD-10-CM

## 2025-07-22 DIAGNOSIS — E55.9 VITAMIN D DEFICIENCY, UNSPECIFIED: ICD-10-CM

## 2025-07-22 DIAGNOSIS — G47.33 OBSTRUCTIVE SLEEP APNEA (ADULT) (PEDIATRIC): ICD-10-CM

## 2025-07-22 DIAGNOSIS — R73.03 PREDIABETES.: ICD-10-CM

## 2025-07-22 LAB
HBA1C MFR BLD HPLC: 5.8
LDLC SERPL DIRECT ASSAY-MCNC: 69

## 2025-07-22 PROCEDURE — 99214 OFFICE O/P EST MOD 30 MIN: CPT

## 2025-07-22 PROCEDURE — G2211 COMPLEX E/M VISIT ADD ON: CPT

## 2025-07-22 RX ORDER — ATORVASTATIN CALCIUM 10 MG/1
10 TABLET, FILM COATED ORAL
Qty: 90 | Refills: 1 | Status: ACTIVE | COMMUNITY
Start: 2025-07-22 | End: 1900-01-01

## 2025-08-10 ENCOUNTER — OUTPATIENT (OUTPATIENT)
Dept: OUTPATIENT SERVICES | Facility: HOSPITAL | Age: 70
LOS: 1 days | End: 2025-08-10
Payer: MEDICARE

## 2025-08-10 DIAGNOSIS — G47.33 OBSTRUCTIVE SLEEP APNEA (ADULT) (PEDIATRIC): ICD-10-CM

## 2025-08-10 PROCEDURE — 95810 POLYSOM 6/> YRS 4/> PARAM: CPT

## 2025-08-10 PROCEDURE — 95811 POLYSOM 6/>YRS CPAP 4/> PARM: CPT | Mod: 26

## 2025-08-21 RX ORDER — TIRZEPATIDE 2.5 MG/.5ML
2.5 INJECTION, SOLUTION SUBCUTANEOUS
Qty: 4 | Refills: 1 | Status: ACTIVE | COMMUNITY
Start: 2025-08-18

## 2025-09-15 ENCOUNTER — NON-APPOINTMENT (OUTPATIENT)
Age: 70
End: 2025-09-15